# Patient Record
(demographics unavailable — no encounter records)

---

## 2025-04-21 NOTE — HISTORY OF PRESENT ILLNESS
[de-identified] : Age: 12 year M PMHx: none Hand Dominance: RHD Chief Complaint: Right elbow pain onset approx. early March 2025. Patient reports that he plays baseball and has noticed his pain onset when his season started. Patient notes pain with throwing the baseball, but none at rest. Patient has been taking tylenol PRN after his games with some relief. Denies numbness/tingling.  Trauma: constant throwing in baseball games Outside Imaging/Treatment: none OTC Medications: Tylenol PRN with some relief OT/PT: none Bracing: none Pain worse with: pitching  Pain better with: rest, tylenol ***Accompanied by father***

## 2025-04-21 NOTE — HISTORY OF PRESENT ILLNESS
[de-identified] : Age: 12 year M PMHx: none Hand Dominance: RHD Chief Complaint: Right elbow pain onset approx. early March 2025. Patient reports that he plays baseball and has noticed his pain onset when his season started. Patient notes pain with throwing the baseball, but none at rest. Patient has been taking tylenol PRN after his games with some relief. Denies numbness/tingling.  Trauma: constant throwing in baseball games Outside Imaging/Treatment: none OTC Medications: Tylenol PRN with some relief OT/PT: none Bracing: none Pain worse with: pitching  Pain better with: rest, tylenol ***Accompanied by father***

## 2025-05-21 NOTE — PHYSICAL EXAM
[de-identified] : Neurologic: normal mood and affect, orientated and able to communicate Constitutional: well developed and well nourished.  Right Elbow: flexor pronator mass tenderness  Right Shoulder: Supine scapular stabilized internal rotation of right shoulder is 15 degrees compared to 70 degrees on the left shoulder

## 2025-05-21 NOTE — HISTORY OF PRESENT ILLNESS
[de-identified] : The patient is a 12 year old [RIGHT] hand dominant male who presents today complaining of RT elbow pain   Date of Injury/Onset: 2 months Pain:    At Rest: 0/10  With Activity:  9/10  Mechanism of injury: onset of elbow pain after inc baseball activity/throwing This is NOT a Work Related Injury being treated under Worker's Compensation. This is NOT an athletic injury occurring associated with an interscholastic or organized sports team. Quality of symptoms: sharp pain medial and lateral epicondyle, medial > lateral Improves with: rest, activity modification  Worse with: throwing a baseball Prior treatment: Pt @ [facility] [place] last two weeks. Seen by Dr Ordonez- dx with little Legue elbow Prior Imaging: XR 4/21/25 Out of work/sport: out of sport School/Sport/Position/Occupation: [employer]- baseball, flag football and basketball  Additional Information: None

## 2025-05-21 NOTE — DATA REVIEWED
[FreeTextEntry1] : 4/21/25 OC X-RAY Right Elbow 3 views: This scan was reviewed and interpreted by Dr. Silva, and his findings are- slight widening to the medial epicondyle growth plate
[FreeTextEntry1] : 4/21/25 OC X-RAY Right Elbow 3 views: This scan was reviewed and interpreted by Dr. Silva, and his findings are- slight widening to the medial epicondyle growth plate
Admit/Transfer to Inpatient Psychiatry

## 2025-05-21 NOTE — PHYSICAL EXAM
[de-identified] : Neurologic: normal mood and affect, orientated and able to communicate Constitutional: well developed and well nourished.  Right Elbow: flexor pronator mass tenderness  Right Shoulder: Supine scapular stabilized internal rotation of right shoulder is 15 degrees compared to 70 degrees on the left shoulder

## 2025-05-21 NOTE — ADDENDUM
[FreeTextEntry1] : Documented by Armando Mera acting as a scribe for Dr. Silva and Harris Dominique PA-C on 05/20/2025 and was presence for the following sections: Physical Exam; Data Reviewed; Assessment; Discussion/Summary. All medical record entries made by the Scribe were at my, Dr. Silva, and Harris Dominique, direction and personally dictated by me on 05/20/2025. I have reviewed the chart and agree that the record accurately reflects my personal performance of the history, physical exam, procedure and imaging.

## 2025-05-21 NOTE — DISCUSSION/SUMMARY
[de-identified] : Reviewed all X Ray images with patient today and interpretation was provided. Patient shutdown from throwing. Patient was given prescription of formal physical therapy for strengthening and stretching. LITTLE LEAGUE ELBOW Patient will follow up in 4 weeks.     *Patient completing PT with Brandyn at  [facility], recommended to work with [facility]*     ----------------------------------------------- Home Exercise The patient is instructed on a home exercise program.   DEVAUGHN BAZAN Acting as a Scribe for Dr. Ricardo MASON, Devaughn Bazan, attest that this documentation has been prepared under the direction and in the presence of Provider Dr. Silva.   Activity Modification The patient was advised to modify their activities.   Dx / Natural History The patient was advised of the diagnosis. The natural history of the pathology was explained in full to the patient in layman's terms. Several different treatment options were discussed and explained in full to the patient including the risks and benefits of both surgical and non-surgical treatments.  All questions and concerns were answered.   Pain Guide Activities The patient was advised to let pain guide the gradual advancement of activities.   JONG MASON explained to the patient that rest, ice, compression, and elevation would benefit them. They may return to activity after follow-up or when they no longer have any pain.   The patient's current medication management of their orthopedic diagnosis was reviewed today: (1) We discussed a comprehensive treatment plan that included possible pharmaceutical management involving the use of prescription strength medications including but not limited to options such as oral Naprosyn 500mg BID, once daily Meloxicam 15 mg, or 500-650 mg Tylenol versus over the counter oral medications and topical prescription NSAID Pennsaid vs over the counter Voltaren gel. (2) There is a moderate risk of morbidity with further treatment, especially from use of prescription strength medications and possible side effects of these medications which include upset stomach with oral medications, skin reactions to topical medications and cardiac/renal issues with long term use. (3) I recommended that the patient follow-up with their medical physician to discuss any significant specific potential issues with long term medication use such as interactions with current medications or with exacerbation of underlying medical comorbidities. (4) The benefits and risks associated with use of injectable, oral or topical, prescription and over the counter anti-inflammatory medications were discussed with the patient. The patient voiced understanding of the risks including but not limited to bleeding, stroke, kidney dysfunction, heart disease, and were referred to the black box warning label for further information.

## 2025-05-21 NOTE — HISTORY OF PRESENT ILLNESS
[de-identified] : The patient is a 12 year old [RIGHT] hand dominant male who presents today complaining of RT elbow pain   Date of Injury/Onset: 2 months Pain:    At Rest: 0/10  With Activity:  9/10  Mechanism of injury: onset of elbow pain after inc baseball activity/throwing This is NOT a Work Related Injury being treated under Worker's Compensation. This is NOT an athletic injury occurring associated with an interscholastic or organized sports team. Quality of symptoms: sharp pain medial and lateral epicondyle, medial > lateral Improves with: rest, activity modification  Worse with: throwing a baseball Prior treatment: Pt @ [facility] [place] last two weeks. Seen by Dr Ordonez- dx with little Legue elbow Prior Imaging: XR 4/21/25 Out of work/sport: out of sport School/Sport/Position/Occupation: [employer]- baseball, flag football and basketball  Additional Information: None

## 2025-05-21 NOTE — DISCUSSION/SUMMARY
[de-identified] : Reviewed all X Ray images with patient today and interpretation was provided. Patient shutdown from throwing. Patient was given prescription of formal physical therapy for strengthening and stretching. LITTLE LEAGUE ELBOW Patient will follow up in 4 weeks.     *Patient completing PT with Brandyn at  [facility], recommended to work with [facility]*     ----------------------------------------------- Home Exercise The patient is instructed on a home exercise program.   DEVAUGHN BAZAN Acting as a Scribe for Dr. Ricardo MASON, Devaughn Bazan, attest that this documentation has been prepared under the direction and in the presence of Provider Dr. Silva.   Activity Modification The patient was advised to modify their activities.   Dx / Natural History The patient was advised of the diagnosis. The natural history of the pathology was explained in full to the patient in layman's terms. Several different treatment options were discussed and explained in full to the patient including the risks and benefits of both surgical and non-surgical treatments.  All questions and concerns were answered.   Pain Guide Activities The patient was advised to let pain guide the gradual advancement of activities.   JONG MASON explained to the patient that rest, ice, compression, and elevation would benefit them. They may return to activity after follow-up or when they no longer have any pain.   The patient's current medication management of their orthopedic diagnosis was reviewed today: (1) We discussed a comprehensive treatment plan that included possible pharmaceutical management involving the use of prescription strength medications including but not limited to options such as oral Naprosyn 500mg BID, once daily Meloxicam 15 mg, or 500-650 mg Tylenol versus over the counter oral medications and topical prescription NSAID Pennsaid vs over the counter Voltaren gel. (2) There is a moderate risk of morbidity with further treatment, especially from use of prescription strength medications and possible side effects of these medications which include upset stomach with oral medications, skin reactions to topical medications and cardiac/renal issues with long term use. (3) I recommended that the patient follow-up with their medical physician to discuss any significant specific potential issues with long term medication use such as interactions with current medications or with exacerbation of underlying medical comorbidities. (4) The benefits and risks associated with use of injectable, oral or topical, prescription and over the counter anti-inflammatory medications were discussed with the patient. The patient voiced understanding of the risks including but not limited to bleeding, stroke, kidney dysfunction, heart disease, and were referred to the black box warning label for further information.

## 2025-06-22 NOTE — HISTORY OF PRESENT ILLNESS
[de-identified] : The patient is a 12 year old [RIGHT] hand dominant male who presents today complaining of RT elbow pain   Date of Injury/Onset: 2 months Pain:    At Rest: 0/10  With Activity:  0/10  Mechanism of injury: onset of elbow pain after inc baseball activity/throwing This is NOT a Work Related Injury being treated under Worker's Compensation. This is NOT an athletic injury occurring associated with an interscholastic or organized sports team. Quality of symptoms: sharp pain medial and lateral epicondyle, medial > lateral Improves with: rest, activity modification  Worse with: throwing a baseball Treatment/Imaging/Studies Since Last Visit: PT @ [facility]- working on ROM, RTC and scap strengthening 	Reports Available For Review Today: _ Change since last visit: Patient reports dec in pain with activity since starting PT. Reports he is able to perform all threx and ADL's with little to no pain. Would like to start a return to throwing program Out of work/sport: out of sport School/Sport/Position/Occupation: [employer]- baseball, flag football and basketball  Additional Information: None

## 2025-06-22 NOTE — ADDENDUM
[FreeTextEntry1] : Documented by Armando Mera acting as a scribe for Dr. Silva and Harris Dominique PA-C on 06/17/2025 and was presence for the following sections: Physical Exam; Data Reviewed; Assessment; Discussion/Summary. All medical record entries made by the Scribe were at my, Dr. Silva, and Harris Dominique, direction and personally dictated by me on 06/17/2025. I have reviewed the chart and agree that the record accurately reflects my personal performance of the history, physical exam, procedure and imaging.

## 2025-06-22 NOTE — DISCUSSION/SUMMARY
[de-identified] : Patient doing well during today's visit. Patient advised to start return to throwing program. Patient will follow up as needed.     *Patient completing physical therapy with Brandyn at [facility] in [place]* *Patient has game on July 1*     ----------------------------------------------- Home Exercise The patient is instructed on a home exercise program.   DEVAUGHN BAZAN Acting as a Scribe for Dr. Ricardo MASON, Devaughn Bazan, attest that this documentation has been prepared under the direction and in the presence of Provider Dr. Silva.   Activity Modification The patient was advised to modify their activities.   Dx / Natural History The patient was advised of the diagnosis. The natural history of the pathology was explained in full to the patient in layman's terms. Several different treatment options were discussed and explained in full to the patient including the risks and benefits of both surgical and non-surgical treatments.  All questions and concerns were answered.   Pain Guide Activities The patient was advised to let pain guide the gradual advancement of activities.   RICE I explained to the patient that rest, ice, compression, and elevation would benefit them. They may return to activity after follow-up or when they no longer have any pain.   The patient's current medication management of their orthopedic diagnosis was reviewed today: (1) We discussed a comprehensive treatment plan that included possible pharmaceutical management involving the use of prescription strength medications including but not limited to options such as oral Naprosyn 500mg BID, once daily Meloxicam 15 mg, or 500-650 mg Tylenol versus over the counter oral medications and topical prescription NSAID Pennsaid vs over the counter Voltaren gel. (2) There is a moderate risk of morbidity with further treatment, especially from use of prescription strength medications and possible side effects of these medications which include upset stomach with oral medications, skin reactions to topical medications and cardiac/renal issues with long term use. (3) I recommended that the patient follow-up with their medical physician to discuss any significant specific potential issues with long term medication use such as interactions with current medications or with exacerbation of underlying medical comorbidities. (4) The benefits and risks associated with use of injectable, oral or topical, prescription and over the counter anti-inflammatory medications were discussed with the patient. The patient voiced understanding of the risks including but not limited to bleeding, stroke, kidney dysfunction, heart disease, and were referred to the black box warning label for further information.

## 2025-06-22 NOTE — DATA REVIEWED
[FreeTextEntry1] : 4/21/25 OC X-RAY Right Elbow 3 views: This scan was reviewed and interpreted by Dr. Silva, and his findings are- slight widening to the medial epicondyle growth plate

## 2025-06-22 NOTE — PHYSICAL EXAM
[de-identified] : Neurologic: normal mood and affect, orientated and able to communicate Constitutional: well developed and well nourished.  Right Elbow: Full ROM Ligamental stable Nontender no effusion Neurovascularly intact distally  Right Shoulder: Supine scapular stabilized internal rotation of right shoulder is 45 degrees compared to 70 degrees on the left shoulder

## 2025-06-22 NOTE — HISTORY OF PRESENT ILLNESS
[de-identified] : The patient is a 12 year old [RIGHT] hand dominant male who presents today complaining of RT elbow pain   Date of Injury/Onset: 2 months Pain:    At Rest: 0/10  With Activity:  0/10  Mechanism of injury: onset of elbow pain after inc baseball activity/throwing This is NOT a Work Related Injury being treated under Worker's Compensation. This is NOT an athletic injury occurring associated with an interscholastic or organized sports team. Quality of symptoms: sharp pain medial and lateral epicondyle, medial > lateral Improves with: rest, activity modification  Worse with: throwing a baseball Treatment/Imaging/Studies Since Last Visit: PT @ [facility]- working on ROM, RTC and scap strengthening 	Reports Available For Review Today: _ Change since last visit: Patient reports dec in pain with activity since starting PT. Reports he is able to perform all threx and ADL's with little to no pain. Would like to start a return to throwing program Out of work/sport: out of sport School/Sport/Position/Occupation: [employer]- baseball, flag football and basketball  Additional Information: None

## 2025-06-22 NOTE — DISCUSSION/SUMMARY
[de-identified] : Patient doing well during today's visit. Patient advised to start return to throwing program. Patient will follow up as needed.     *Patient completing physical therapy with Brandyn at [facility] in [place]* *Patient has game on July 1*     ----------------------------------------------- Home Exercise The patient is instructed on a home exercise program.   DEVAUGHN BAZAN Acting as a Scribe for Dr. Ricardo MASON, Devaughn Bazan, attest that this documentation has been prepared under the direction and in the presence of Provider Dr. Silva.   Activity Modification The patient was advised to modify their activities.   Dx / Natural History The patient was advised of the diagnosis. The natural history of the pathology was explained in full to the patient in layman's terms. Several different treatment options were discussed and explained in full to the patient including the risks and benefits of both surgical and non-surgical treatments.  All questions and concerns were answered.   Pain Guide Activities The patient was advised to let pain guide the gradual advancement of activities.   RICE I explained to the patient that rest, ice, compression, and elevation would benefit them. They may return to activity after follow-up or when they no longer have any pain.   The patient's current medication management of their orthopedic diagnosis was reviewed today: (1) We discussed a comprehensive treatment plan that included possible pharmaceutical management involving the use of prescription strength medications including but not limited to options such as oral Naprosyn 500mg BID, once daily Meloxicam 15 mg, or 500-650 mg Tylenol versus over the counter oral medications and topical prescription NSAID Pennsaid vs over the counter Voltaren gel. (2) There is a moderate risk of morbidity with further treatment, especially from use of prescription strength medications and possible side effects of these medications which include upset stomach with oral medications, skin reactions to topical medications and cardiac/renal issues with long term use. (3) I recommended that the patient follow-up with their medical physician to discuss any significant specific potential issues with long term medication use such as interactions with current medications or with exacerbation of underlying medical comorbidities. (4) The benefits and risks associated with use of injectable, oral or topical, prescription and over the counter anti-inflammatory medications were discussed with the patient. The patient voiced understanding of the risks including but not limited to bleeding, stroke, kidney dysfunction, heart disease, and were referred to the black box warning label for further information.

## 2025-06-22 NOTE — PHYSICAL EXAM
[de-identified] : Neurologic: normal mood and affect, orientated and able to communicate Constitutional: well developed and well nourished.  Right Elbow: Full ROM Ligamental stable Nontender no effusion Neurovascularly intact distally  Right Shoulder: Supine scapular stabilized internal rotation of right shoulder is 45 degrees compared to 70 degrees on the left shoulder